# Patient Record
Sex: FEMALE | Race: WHITE | NOT HISPANIC OR LATINO | Employment: OTHER | URBAN - METROPOLITAN AREA
[De-identification: names, ages, dates, MRNs, and addresses within clinical notes are randomized per-mention and may not be internally consistent; named-entity substitution may affect disease eponyms.]

---

## 2017-06-09 ENCOUNTER — TELEPHONE (OUTPATIENT)
Dept: OBSTETRICS AND GYNECOLOGY | Facility: CLINIC | Age: 47
End: 2017-06-09

## 2017-06-09 NOTE — TELEPHONE ENCOUNTER
Madelyn pt, hasn't been seen since 12/2014.  Pt has a breast lump that has gotten larger over the past month.  She has a history of breast cysts but she is not sure if its a cyst this time.  Scheduled with Rosa Monday 8/12 and scheduled annual with Dr. Joshi 6/27.

## 2017-06-12 ENCOUNTER — HOSPITAL ENCOUNTER (OUTPATIENT)
Dept: RADIOLOGY | Facility: HOSPITAL | Age: 47
Discharge: HOME | End: 2017-06-12
Attending: NURSE PRACTITIONER
Payer: COMMERCIAL

## 2017-06-12 ENCOUNTER — OFFICE VISIT (OUTPATIENT)
Dept: OBSTETRICS AND GYNECOLOGY | Facility: CLINIC | Age: 47
End: 2017-06-12
Payer: COMMERCIAL

## 2017-06-12 VITALS
SYSTOLIC BLOOD PRESSURE: 112 MMHG | HEIGHT: 67 IN | BODY MASS INDEX: 31.78 KG/M2 | DIASTOLIC BLOOD PRESSURE: 80 MMHG | WEIGHT: 202.5 LBS

## 2017-06-12 DIAGNOSIS — N63.21 BREAST LUMP ON LEFT SIDE AT 2 O'CLOCK POSITION: Primary | ICD-10-CM

## 2017-06-12 DIAGNOSIS — N63.23 BREAST LUMP ON LEFT SIDE AT 4 O'CLOCK POSITION: ICD-10-CM

## 2017-06-12 DIAGNOSIS — N63.25 BREAST LUMP ON LEFT SIDE AT 3 O'CLOCK POSITION: ICD-10-CM

## 2017-06-12 DIAGNOSIS — N63.21 BREAST LUMP ON LEFT SIDE AT 2 O'CLOCK POSITION: ICD-10-CM

## 2017-06-12 PROCEDURE — 77062 BREAST TOMOSYNTHESIS BI: CPT | Mod: TC

## 2017-06-12 PROCEDURE — 76642 ULTRASOUND BREAST LIMITED: CPT | Mod: 26,50,, | Performed by: RADIOLOGY

## 2017-06-12 PROCEDURE — 99214 OFFICE O/P EST MOD 30 MIN: CPT | Mod: S$GLB,,, | Performed by: NURSE PRACTITIONER

## 2017-06-12 PROCEDURE — 99999 PR PBB SHADOW E&M-EST. PATIENT-LVL III: CPT | Mod: PBBFAC,,, | Performed by: NURSE PRACTITIONER

## 2017-06-12 PROCEDURE — 77066 DX MAMMO INCL CAD BI: CPT | Mod: 26,,, | Performed by: RADIOLOGY

## 2017-06-12 PROCEDURE — 77062 BREAST TOMOSYNTHESIS BI: CPT | Mod: 26,,, | Performed by: RADIOLOGY

## 2017-06-12 PROCEDURE — 76642 ULTRASOUND BREAST LIMITED: CPT | Mod: TC,50

## 2017-06-12 NOTE — PROGRESS NOTES
"Chief Complaints: Breast lump    HPI:  46 y.o. F No obstetric history on file. presents today complaining of a breast lump on the left breast that is getting larger. It is on the left lateral part of the breast, no erythema, no dimpling, no rash, has started to get TTT. She noticed the lump about one month ago. She reports a history of breast cysts but cysts generally resolve on own without any need for drainage or removal. No current breast warmth/rashes/dimpling/recent change in size, or nipple discharge. No history of breast masses. Denies f/c/n/v. She has started exercise but doesn't believe any trauma to this breast.     /80   Ht 5' 7" (1.702 m)   Wt 91.8 kg (202 lb 7.9 oz)   LMP 05/23/2017   BMI 31.71 kg/m²     ROS:  GENERAL: No abnormal weight loss or gain Feeling well overall.   SKIN: Denies rash or lesions to breast  NODES: Denies enlarged lymph nodes.   BREASTS: -see HPI  HEMATOLOGIC: No easy bruisability or excessive bleeding.  MUSCULOSKELETAL: Denies trauma     Physical Exam:  Vitals: normal and normal BMI  APPEARANCE: Well nourished, well developed, in no acute distress.   Psychiatric: alert and oriented x 3, affect normal  NECK: Neck symmetric without masses   Lungs:°No use of accessory muscles noted or signs of respiratory distress   Lymph Nodes:° No axillary or supraclavicular adenopathy  Right Breast:  Appearance of the breast was normal.  ° Palpation of the breast revealed no abnormalities. No nipple discharge, no rashes, no dimpling, no LAD, no dimpling, no tenderness   Left Breast: Appearance of the breast was normal sitting, when supine there is a noticeable lump in the upper outer breast.  ° Palpation of the breast revealed one abnormalities of a 3 inch by 1.75 in (measured) nodule, firm, irregular shape, movable, mildly ttt. It is right outside of the areola bw 2-4 o'clock no erythema, no warmth, no skin changes, No nipple discharge, no rashes, no dimpling, no LAD, no dimpling, no " tenderness   Skin: no lesions         Assessment/Plan    1. Left breast lump bw 2-4--US and diagnostic MMG of the left breast and will call with results. Discussed potential etiologies at this time. All questions answered, but will call with changes in plan pending results    RTC for annual as scheduled with Dr. Joshi.

## 2017-06-12 NOTE — PATIENT INSTRUCTIONS
Breast Lump, Uncertain Cause    A lump was found in your breast. Most breast lumps are not cancer. They may be caused by normal changes in the breast tissue due to hormone variations that occur with your menstrual cycle. Some women may form lumps that are painful and tender. Others may form lumps that are painless.  At this time, is not possible to be certain of the cause of your lump without further evaluation. This could include:  · Another exam by your healthcare provider or a gynecologist  · Imaging tests, such as a mammogram or ultrasound  · Biopsy (procedure to remove small tissue samples from the breast lump)  Your healthcare provider will explain any additional testing that is needed. Be sure to get answers to any questions you may have.  Home care  Until a diagnosis is made, you may be advised to do the following:  · If you are having breast pain:  ¨ Take an over-the-counter pain reliever, if directed to by your provider.  ¨ Wear a well-fitted bra or sports bra for extra support. If you have breast pain at night, try wearing the bra during sleep.  ¨ Apply a warm compress (towel soaked in warm water) to the breast. You may also use a hot water bottle.  · Check your breasts each day. Keep a log of whether the lump seems to be changing in size or tenderness with your period. This can help your healthcare provider make the correct diagnosis.  Follow-up care  Follow up with your healthcare provider as directed. Keep all appointments. Also, prepare for any upcoming tests as directed.  When to seek medical advice  Call your healthcare provider right away if any of these occur:  · Fever of 100.4°F (38°C) or higher  · Redness or swelling of the breast  · Discharge from the nipple  · Visible changes in the skin over the nipple or breast  · Lump grows larger, feels very hard, or has an irregular shape  · New lumps form  Date Last Reviewed: 4/26/2015  © 3947-4861 [employer]. [address] [address],  HARIKA Grossman [address]. All rights reserved. This information is not intended as a substitute for professional medical care. Always follow your healthcare professional's instructions.

## 2017-06-13 ENCOUNTER — TELEPHONE (OUTPATIENT)
Dept: OBSTETRICS AND GYNECOLOGY | Facility: CLINIC | Age: 47
End: 2017-06-13

## 2017-06-13 ENCOUNTER — PATIENT MESSAGE (OUTPATIENT)
Dept: OBSTETRICS AND GYNECOLOGY | Facility: CLINIC | Age: 47
End: 2017-06-13

## 2017-06-13 NOTE — TELEPHONE ENCOUNTER
Discussed MMG/US results with patient. Waiting on previous images to discuss plan at this time. Benign cyst where palpated, other masses noted, waiting on films. Pt will notify office if does not hear from radiology in one week.

## 2017-06-26 ENCOUNTER — TELEPHONE (OUTPATIENT)
Dept: OBSTETRICS AND GYNECOLOGY | Facility: CLINIC | Age: 47
End: 2017-06-26

## 2017-06-26 NOTE — TELEPHONE ENCOUNTER
mmg follow-up  Not able to leave message for patient to return call. Please let me know when pt calls or attempt another call. Need to review addendum to images

## 2017-06-27 ENCOUNTER — TELEPHONE (OUTPATIENT)
Dept: OBSTETRICS AND GYNECOLOGY | Facility: CLINIC | Age: 47
End: 2017-06-27

## 2017-06-27 NOTE — TELEPHONE ENCOUNTER
discussed addendum to images with patient  She reports area has resolved at this time   Did discuss breast clinic vs monitoring. She will also discuss with dr ribeiro at annual on the 5th and have fu CBE

## 2017-07-05 ENCOUNTER — OFFICE VISIT (OUTPATIENT)
Dept: OBSTETRICS AND GYNECOLOGY | Facility: CLINIC | Age: 47
End: 2017-07-05
Payer: COMMERCIAL

## 2017-07-05 VITALS
WEIGHT: 194.56 LBS | BODY MASS INDEX: 30.54 KG/M2 | SYSTOLIC BLOOD PRESSURE: 126 MMHG | DIASTOLIC BLOOD PRESSURE: 84 MMHG | HEIGHT: 67 IN

## 2017-07-05 DIAGNOSIS — N60.02 BILATERAL BREAST CYSTS: ICD-10-CM

## 2017-07-05 DIAGNOSIS — Z12.4 SCREENING FOR CERVICAL CANCER: ICD-10-CM

## 2017-07-05 DIAGNOSIS — N60.01 BILATERAL BREAST CYSTS: ICD-10-CM

## 2017-07-05 DIAGNOSIS — Z01.419 ENCOUNTER FOR GYNECOLOGICAL EXAMINATION: Primary | ICD-10-CM

## 2017-07-05 DIAGNOSIS — Z11.51 SCREENING FOR HPV (HUMAN PAPILLOMAVIRUS): ICD-10-CM

## 2017-07-05 DIAGNOSIS — Z12.31 VISIT FOR SCREENING MAMMOGRAM: ICD-10-CM

## 2017-07-05 PROCEDURE — 99999 PR PBB SHADOW E&M-EST. PATIENT-LVL III: CPT | Mod: PBBFAC,,, | Performed by: OBSTETRICS & GYNECOLOGY

## 2017-07-05 PROCEDURE — 88175 CYTOPATH C/V AUTO FLUID REDO: CPT

## 2017-07-05 PROCEDURE — 87624 HPV HI-RISK TYP POOLED RSLT: CPT

## 2017-07-05 PROCEDURE — 99396 PREV VISIT EST AGE 40-64: CPT | Mod: S$GLB,,, | Performed by: OBSTETRICS & GYNECOLOGY

## 2017-07-05 NOTE — PROGRESS NOTES
Subjective:       Patient ID: Dulce Wetzel is a 46 y.o. female.    Chief Complaint:  Well Woman (last pap and HPV negative 12/01/14, last mammogram 6/12/17 showed cyst)      History of Present Illness.  Dulce Wetzel is a 46 y.o. female.  She has no breast or urinary symptoms.  She has no menorrhagia, oligomenorrhea, bleeding betweeen menses, postcoital bleeding, dysmenorrhea, pelvic pain, dyspareunia, vaginal dryness, vaginal discharge, or sexual complaints.  She has regular periods.   had vasectomy    GYN & OB History  Patient's last menstrual period was 06/19/2017.   Date of Last Pap: 12/2014 Normal HPV negative  Date of last mammogram:   Addenda     Prior exams dating back to 11/14/13 are now available.  The stability of the left breast mass at 6 o'clock cannot be easily determined due to overlapping cysts obscuring the area on MLO views.  This lesion is circumscribed and hypoechoic and is probably benign.  6 month follow-up limited left ultrasound is recommended for the left breast lesion at 6 o'clock.     BIRADS ASSESSMENT CATEGORY: 3 PROBABLY BENIGN.      Signed by Marysol Bridges MD on 6/22/2017  9:42 AM   Narrative & Impression     Result:   Mammo Digital Diagnostic Bilat with Tomosynthesis_CAD  US Breast Bilateral Limited     History:  Patient is 46 y.o. and is seen for breast lump on left side at 2 o'clock position.           Medical history includes simple breast cyst.     Films Compared:  No prior exams are available.  Patient reports prior imaging at [facility].     Findings:  This procedure was performed using tomosynthesis.  Computer-aided detection was utilized in the interpretation of this examination.  The breasts are heterogeneously dense, which may obscure small masses.      Left  Mammo Digital Diagnostic Bilat with Tomosynthesis_CAD  There is asymmetry seen in the lower region of the left breast.      There is an equal density, oval mass with obscured margins seen in the upper outer  quadrant of the left breast. The mass correlates with a palpable mass noted during physical examination.      US Breast Bilateral Limited  There is a 1.2 mm x 0.4 mm x 0.5 mm oval, hypoechoic mass with circumscribed margins seen in the left breast at 6 o'clock, 1 cm from the nipple.      There is a 46 mm oval, primarily anechoic mass with mobile internal echoes and  circumscribed margins with enhancement seen in the left breast at 2 o'clock, 4 cm from the nipple. The mass correlates with a palpable mass noted during physical examination and is consistent with a benign complicated cyst.      Right  Mammo Digital Diagnostic Bilat with Tomosynthesis_CAD  There is an equal density, oval mass with circumscribed margins seen in the upper inner quadrant of the right breast.      US Breast Bilateral Limited  There is a 29 mm oval, anechoic mass with enhancement seen in the right breast at 3 o'clock, 1 cm from the nipple.      Bilateral  Mammo Digital Diagnostic Bilat with Tomosynthesis_CAD  There are masses seen in both breasts.      Impression:  1.  Bilateral breast cysts are benign.  One of these corresponds to the palpable lump in the upper outer left breast.      2.   Circumscribed mass in the left breast at 6 o'clock is indeterminate.   Comparison with prior films is recommended. The radiology department will attempt to obtain prior exams.  If they are not available, and addendum will be issued and short term follow-up will be recommended for left breast mass at 6 o'clock.  I discussed this with the patient at the time of exam.         BI-RADS Category:   Overall: 0 - Incomplete: Need Prior Mammograms for Comparison         OB History    Para Term  AB Living   1 1 1     1   SAB TAB Ectopic Multiple Live Births           1      # Outcome Date GA Lbr Harinder/2nd Weight Sex Delivery Anes PTL Lv   1 Term 01 40w0d  3.459 kg (7 lb 10 oz) F CS-LTranv  Y MOISE      Complications: Failure to Progress in Second  "Stage      Obstetric Comments   Menarche ~ 15       Past Medical History:   Diagnosis Date    Anemia     in pregnancy    Breast cyst     Rh incompatibility      Past Surgical History:   Procedure Laterality Date     SECTION       Family History   Problem Relation Age of Onset    Kidney cancer Father     Lung cancer Mother      non-smoker    No Known Problems Sister     No Known Problems Brother     Heart attack Maternal Grandmother     No Known Problems Paternal Grandmother     No Known Problems Sister     No Known Problems Sister     Breast cancer Neg Hx     Colon cancer Neg Hx     Ovarian cancer Neg Hx      Social History   Substance Use Topics    Smoking status: Current Every Day Smoker    Smokeless tobacco: Current User    Alcohol use No     No current outpatient prescriptions on file.    Review of patient's allergies indicates:  No Known Allergies    Review of Systems  Review of Systems   Constitutional: Negative for fatigue.   HENT: Negative for trouble swallowing.    Eyes: Negative for visual disturbance.   Respiratory: Negative for cough and shortness of breath.    Cardiovascular: Negative for chest pain.   Gastrointestinal: Negative for abdominal distention, abdominal pain, blood in stool, nausea and vomiting.   Genitourinary: Negative for difficulty urinating, dyspareunia, dysuria, flank pain, frequency, hematuria, pelvic pain, urgency, vaginal bleeding, vaginal discharge and vaginal pain.   Musculoskeletal: Negative for arthralgias.   Skin: Negative for rash.   Neurological: Negative for dizziness and headaches.   Psychiatric/Behavioral: Negative for sleep disturbance. The patient is not nervous/anxious.         Objective:     Vitals:    17 1021   BP: 126/84   Weight: 88.3 kg (194 lb 8.9 oz)   Height: 5' 7" (1.702 m)   PainSc: 0-No pain     Body mass index is 30.47 kg/m².    Physical Exam:   Constitutional: She is oriented to person, place, and time. Vital signs are normal. " She appears well-developed and well-nourished.    HENT:   Head: Normocephalic.     Neck: Normal range of motion. No thyromegaly present.     Pulmonary/Chest: She exhibits mass (bilaterla multiple breast cysts). Right breast exhibits no mass, no nipple discharge, no skin change, no tenderness and no swelling. Left breast exhibits no mass, no nipple discharge, no skin change, no tenderness and no swelling. Breasts are symmetrical.        Abdominal: Soft. Normal appearance and bowel sounds are normal. She exhibits no distension. There is no tenderness.     Genitourinary: Vagina normal and uterus normal. Pelvic exam was performed with patient supine. There is no rash, tenderness, lesion or injury on the right labia. There is no rash, tenderness, lesion or injury on the left labia. Cervix is normal. Right adnexum displays no mass, no tenderness and no fullness. Left adnexum displays no mass, no tenderness and no fullness. No erythema in the vagina. No vaginal discharge found. Cervix exhibits no motion tenderness and no discharge.           Musculoskeletal: Normal range of motion.      Lymphadenopathy:        Right: No inguinal and no supraclavicular adenopathy present.        Left: No inguinal and no supraclavicular adenopathy present.    Neurological: She is alert and oriented to person, place, and time.    Skin: Skin is warm and dry.    Psychiatric: She has a normal mood and affect.        Assessment/ Plan:   Encounter for gynecological examination    Screening for HPV (human papillomavirus)  -     HPV High Risk Genotypes, PCR    Screening for cervical cancer  -     Liquid-based pap smear, screening    Visit for screening mammogram  -     Cancel: Mammo Digital Screening Bilat with Tomosynthesis CAD; Future; Expected date: 07/05/2017    Bilateral breast cysts    Smoking cessation discussed.  Declined Chantix.  Repeat ultrasound in 6 months  Routine pap smears.  Self breast exam and routine mammography discussed.  Diet  and exercise discussed.  Contraception reviewed/discussed.  Follow-up with me in 1 year.

## 2017-07-10 LAB
HPV HR 12 DNA CVX QL NAA+PROBE: NEGATIVE
HPV16 DNA SPEC QL NAA+PROBE: NEGATIVE
HPV18 DNA SPEC QL NAA+PROBE: NEGATIVE

## 2017-07-11 NOTE — PROGRESS NOTES
"Message sent to portal.......    "Good news!  Your pap smear came back and it was normal.  I also tested it for HPV - Human Papilloma Virus - and that came back normal also.  The new recommendations are to check everyone over the age of 30 for HPV.  Because both of these came back negative, you have a very low risk for developing cervical cancer.  I still recommend doing a regular pap smear every year, but you only need the HPV test every 3 years.  Please call me if you have any further questions.  Sincerely,   SEBASTIAN Lopez""

## 2018-05-11 ENCOUNTER — TELEPHONE (OUTPATIENT)
Dept: OBSTETRICS AND GYNECOLOGY | Facility: CLINIC | Age: 48
End: 2018-05-11

## 2018-05-11 DIAGNOSIS — N60.11 CYST OF BREAST, BILATERAL, DIFFUSE FIBROCYSTIC: Primary | ICD-10-CM

## 2018-05-11 DIAGNOSIS — N63.20 BREAST MASS, LEFT: ICD-10-CM

## 2018-05-11 DIAGNOSIS — N60.12 CYST OF BREAST, BILATERAL, DIFFUSE FIBROCYSTIC: Primary | ICD-10-CM

## 2018-05-11 NOTE — TELEPHONE ENCOUNTER
Pt needs mammo orders put in [facility]. Pt would like to be notified when orders are in.    Dulce Wetzel (Self) [phone] (H)

## 2018-05-11 NOTE — TELEPHONE ENCOUNTER
Called pt to notify that after June 2017 mammogram pt was to have f/u left breast US due to mass. Orders entered for bilat digital diagnostic mmg for fibrocystic breast, and also left breast US for mass at 6oclock position. Number given to [facility]. Pt to call today to schedule.    Patient has annual appt with Dr. Joshi on 7/18/18

## 2018-06-21 ENCOUNTER — HOSPITAL ENCOUNTER (OUTPATIENT)
Dept: RADIOLOGY | Facility: HOSPITAL | Age: 48
Discharge: HOME | End: 2018-06-21
Attending: OBSTETRICS & GYNECOLOGY
Payer: COMMERCIAL

## 2018-06-21 VITALS — BODY MASS INDEX: 30.45 KG/M2 | WEIGHT: 194 LBS | HEIGHT: 67 IN

## 2018-06-21 DIAGNOSIS — N63.20 BREAST MASS, LEFT: ICD-10-CM

## 2018-06-21 DIAGNOSIS — N60.11 CYST OF BREAST, BILATERAL, DIFFUSE FIBROCYSTIC: ICD-10-CM

## 2018-06-21 DIAGNOSIS — N60.12 CYST OF BREAST, BILATERAL, DIFFUSE FIBROCYSTIC: ICD-10-CM

## 2018-06-21 PROCEDURE — 77066 DX MAMMO INCL CAD BI: CPT | Mod: 26,,, | Performed by: RADIOLOGY

## 2018-06-21 PROCEDURE — 77062 BREAST TOMOSYNTHESIS BI: CPT | Mod: 26,,, | Performed by: RADIOLOGY

## 2018-06-21 PROCEDURE — 76642 ULTRASOUND BREAST LIMITED: CPT | Mod: 26,LT,, | Performed by: RADIOLOGY

## 2018-06-21 PROCEDURE — 76642 ULTRASOUND BREAST LIMITED: CPT | Mod: TC,PO,LT

## 2018-06-21 PROCEDURE — 77062 BREAST TOMOSYNTHESIS BI: CPT | Mod: TC,PO

## 2018-07-18 ENCOUNTER — OFFICE VISIT (OUTPATIENT)
Dept: OBSTETRICS AND GYNECOLOGY | Facility: CLINIC | Age: 48
End: 2018-07-18
Payer: COMMERCIAL

## 2018-07-18 VITALS
WEIGHT: 197.44 LBS | SYSTOLIC BLOOD PRESSURE: 126 MMHG | DIASTOLIC BLOOD PRESSURE: 80 MMHG | HEIGHT: 67 IN | BODY MASS INDEX: 30.99 KG/M2

## 2018-07-18 DIAGNOSIS — Z12.31 ENCOUNTER FOR SCREENING MAMMOGRAM FOR BREAST CANCER: Primary | ICD-10-CM

## 2018-07-18 DIAGNOSIS — Z13.21 ENCOUNTER FOR VITAMIN DEFICIENCY SCREENING: ICD-10-CM

## 2018-07-18 DIAGNOSIS — Z00.00 PERIODIC HEALTH ASSESSMENT, GENERAL SCREENING, ADULT: ICD-10-CM

## 2018-07-18 PROCEDURE — 99396 PREV VISIT EST AGE 40-64: CPT | Mod: S$GLB,,, | Performed by: OBSTETRICS & GYNECOLOGY

## 2018-07-18 PROCEDURE — 99999 PR PBB SHADOW E&M-EST. PATIENT-LVL III: CPT | Mod: PBBFAC,,, | Performed by: OBSTETRICS & GYNECOLOGY

## 2018-07-18 RX ORDER — VITAMIN B COMPLEX
1 CAPSULE ORAL DAILY
COMMUNITY

## 2018-07-18 NOTE — PROGRESS NOTES
Subjective:       Patient ID: Dulce Wetzel is a 47 y.o. female.    Chief Complaint:  Well Woman (Annual Exam  --  Last Pap/HPV 17, Negative  --   Last MMG 18, Birads 2 Benign )      History of Present Illness.  Dulce Wetzel is a 47 y.o. female.  She has no breast or urinary symptoms.  She has no menorrhagia, oligomenorrhea, bleeding betweeen menses, postcoital bleeding, dysmenorrhea, pelvic pain, dyspareunia, vaginal dryness, vaginal discharge, or sexual complaints.  Stopped smoking 2 months ago and gained 10 pounds.    GYN & OB History  Patient's last menstrual period was 2018 (exact date).   Date of Last Pap: 7/10/2017 Normal HPV negative  Date of last mammogram: 18 Normal    OB History    Para Term  AB Living   1 1 1     1   SAB TAB Ectopic Multiple Live Births           1      # Outcome Date GA Lbr Harinder/2nd Weight Sex Delivery Anes PTL Lv   1 Term 01 40w0d  3.459 kg (7 lb 10 oz) F CS-LTranv Spinal Y MOISE      Complications: Failure to Progress in Second Stage      Obstetric Comments   Menarche ~ 15       Past Medical History:   Diagnosis Date    Anemia     in pregnancy    Breast cyst     Relies on partner vasectomy for contraception     Rh incompatibility      Past Surgical History:   Procedure Laterality Date     SECTION       Family History   Problem Relation Age of Onset    Kidney cancer Father     Cancer Father     Lung cancer Mother         non-smoker    Cancer Mother     No Known Problems Sister     No Known Problems Brother     Heart attack Maternal Grandmother     No Known Problems Paternal Grandmother     No Known Problems Sister     No Known Problems Sister     Breast cancer Neg Hx     Colon cancer Neg Hx     Ovarian cancer Neg Hx      Social History   Substance Use Topics    Smoking status: Former Smoker     Types: Cigarettes     Quit date: 5/15/2018    Smokeless tobacco: Never Used    Alcohol use Yes      Comment: Occational    "      Current Outpatient Prescriptions:     b complex vitamins capsule, Take 1 capsule by mouth once daily., Disp: , Rfl:     leonard prim-linoleic-gamolenic ac (PRIMROSE OIL) 1,000 mg Cap, Take 1 tablet by mouth once daily., Disp: , Rfl:     Review of patient's allergies indicates:  No Known Allergies    Review of Systems  Review of Systems   Constitutional: Negative for fatigue.   HENT: Negative for trouble swallowing.    Eyes: Negative for visual disturbance.   Respiratory: Negative for cough and shortness of breath.    Cardiovascular: Negative for chest pain.   Gastrointestinal: Negative for abdominal distention, abdominal pain, blood in stool, nausea and vomiting.   Genitourinary: Negative for difficulty urinating, dyspareunia, dysuria, flank pain, frequency, hematuria, pelvic pain, urgency, vaginal bleeding, vaginal discharge and vaginal pain.   Musculoskeletal: Negative for arthralgias.   Skin: Negative for rash.   Neurological: Negative for dizziness and headaches.   Psychiatric/Behavioral: Negative for sleep disturbance. The patient is not nervous/anxious.         Objective:     Vitals:    07/18/18 0955   BP: 126/80   Weight: 89.5 kg (197 lb 6.8 oz)   Height: 5' 7" (1.702 m)   PainSc: 0-No pain     Body mass index is 30.92 kg/m².    Physical Exam:   Constitutional: She is oriented to person, place, and time. Vital signs are normal. She appears well-developed and well-nourished.    HENT:   Head: Normocephalic.     Neck: Normal range of motion. No thyromegaly present.     Pulmonary/Chest: Right breast exhibits no mass, no nipple discharge, no skin change, no tenderness and no swelling. Left breast exhibits no mass, no nipple discharge, no skin change, no tenderness and no swelling. Breasts are symmetrical.        Abdominal: Soft. Normal appearance and bowel sounds are normal. She exhibits no distension. There is no tenderness.     Genitourinary: Vagina normal and uterus normal.       Pelvic exam was performed " with patient supine. There is no rash, tenderness, lesion or injury on the right labia. There is no rash, tenderness, lesion or injury on the left labia. Cervix is normal. Right adnexum displays no mass, no tenderness and no fullness. Left adnexum displays no mass, no tenderness and no fullness. No erythema in the vagina. No vaginal discharge found. Cervix exhibits no motion tenderness and no discharge.   Genitourinary Comments: 7 mm irregular lesion left buttocks           Musculoskeletal: Normal range of motion.      Lymphadenopathy:        Right: No inguinal and no supraclavicular adenopathy present.        Left: No inguinal and no supraclavicular adenopathy present.    Neurological: She is alert and oriented to person, place, and time.    Skin: Skin is warm and dry.    Psychiatric: She has a normal mood and affect.        Assessment/ Plan:   Encounter for screening mammogram for breast cancer  -     Mammo Digital Screening Bilat with Tomosynthesis CAD; Future; Expected date: 07/18/2018    Periodic health assessment, general screening, adult  -     CBC auto differential; Future  -     Comprehensive metabolic panel; Future; Expected date: 07/18/2018  -     Hemoglobin A1c; Future; Expected date: 07/18/2018  -     Lipid panel; Future; Expected date: 07/18/2018  -     TSH; Future; Expected date: 07/18/2018    Encounter for vitamin deficiency screening  -     Vitamin D; Future; Expected date: 07/18/2018    BMI 30.0-30.9,adult      Schedule xcision of nevus on left buttocks  Routine pap smears.  Self breast exam and routine mammography discussed.  Diet and exercise discussed.  Contraception reviewed/discussed.  Follow-up with me in 1 year

## 2021-04-16 ENCOUNTER — PATIENT MESSAGE (OUTPATIENT)
Dept: RESEARCH | Facility: HOSPITAL | Age: 51
End: 2021-04-16

## 2024-11-15 ENCOUNTER — HOSPITAL ENCOUNTER (OUTPATIENT)
Dept: RADIOLOGY | Facility: HOSPITAL | Age: 54
Discharge: HOME | End: 2024-11-15
Attending: OBSTETRICS & GYNECOLOGY
Payer: COMMERCIAL

## 2024-11-15 DIAGNOSIS — Z12.31 SCREENING MAMMOGRAM, ENCOUNTER FOR: ICD-10-CM

## 2024-11-15 PROCEDURE — 77063 BREAST TOMOSYNTHESIS BI: CPT | Mod: 26,,, | Performed by: RADIOLOGY

## 2024-11-15 PROCEDURE — 77067 SCR MAMMO BI INCL CAD: CPT | Mod: 26,,, | Performed by: RADIOLOGY

## 2024-11-15 PROCEDURE — 77063 BREAST TOMOSYNTHESIS BI: CPT | Mod: TC
